# Patient Record
Sex: MALE | Race: WHITE | NOT HISPANIC OR LATINO | Employment: OTHER | URBAN - METROPOLITAN AREA
[De-identification: names, ages, dates, MRNs, and addresses within clinical notes are randomized per-mention and may not be internally consistent; named-entity substitution may affect disease eponyms.]

---

## 2017-04-04 ENCOUNTER — TELEPHONE (OUTPATIENT)
Dept: UROLOGY | Facility: CLINIC | Age: 32
End: 2017-04-04

## 2017-04-04 RX ORDER — OXYCODONE HYDROCHLORIDE 15 MG/1
15 TABLET ORAL
COMMUNITY
Start: 2016-12-15 | End: 2016-12-25

## 2017-04-04 NOTE — TELEPHONE ENCOUNTER
----- Message from Yesy Kat sent at 4/4/2017 12:24 PM CDT -----  Contact: wife  Wife - Cruzito Richmond - [phone] has scheduled patient's appt with Dr Harvey for Thursday 04 27 17 for his 6 month checkup/patient will also need blood labs and he goes to [facility] in [place]/please call wife concerning orders for blood work

## 2017-04-27 ENCOUNTER — OFFICE VISIT (OUTPATIENT)
Dept: UROLOGY | Facility: CLINIC | Age: 32
End: 2017-04-27
Payer: COMMERCIAL

## 2017-04-27 VITALS
HEIGHT: 72 IN | DIASTOLIC BLOOD PRESSURE: 80 MMHG | HEART RATE: 76 BPM | SYSTOLIC BLOOD PRESSURE: 140 MMHG | WEIGHT: 165.38 LBS | BODY MASS INDEX: 22.4 KG/M2

## 2017-04-27 DIAGNOSIS — N46.9 MALE FERTILITY PROBLEMS: ICD-10-CM

## 2017-04-27 DIAGNOSIS — R79.89 LOW TESTOSTERONE LEVEL IN MALE: Primary | ICD-10-CM

## 2017-04-27 DIAGNOSIS — C62.11 SEMINOMA OF DESCENDED RIGHT TESTIS: ICD-10-CM

## 2017-04-27 LAB
BILIRUB SERPL-MCNC: NORMAL MG/DL
BLOOD URINE, POC: NORMAL
COLOR, POC UA: YELLOW
GLUCOSE UR QL STRIP: NORMAL
KETONES UR QL STRIP: NORMAL
LEUKOCYTE ESTERASE URINE, POC: NORMAL
NITRITE, POC UA: NORMAL
PH, POC UA: 5
PROTEIN, POC: NORMAL
SPECIFIC GRAVITY, POC UA: 1.03
UROBILINOGEN, POC UA: NORMAL

## 2017-04-27 PROCEDURE — 99214 OFFICE O/P EST MOD 30 MIN: CPT | Mod: 25,S$GLB,, | Performed by: UROLOGY

## 2017-04-27 PROCEDURE — 99999 PR PBB SHADOW E&M-EST. PATIENT-LVL III: CPT | Mod: PBBFAC,,, | Performed by: UROLOGY

## 2017-04-27 PROCEDURE — 81002 URINALYSIS NONAUTO W/O SCOPE: CPT | Mod: S$GLB,,, | Performed by: UROLOGY

## 2017-04-27 PROCEDURE — 1160F RVW MEDS BY RX/DR IN RCRD: CPT | Mod: S$GLB,,, | Performed by: UROLOGY

## 2017-04-27 RX ORDER — GABAPENTIN 300 MG/1
600 CAPSULE ORAL 3 TIMES DAILY
COMMUNITY
End: 2022-04-12

## 2017-04-27 RX ORDER — ESCITALOPRAM OXALATE 10 MG/1
30 TABLET ORAL
COMMUNITY
End: 2017-04-27

## 2017-04-27 RX ORDER — BUPRENORPHINE HYDROCHLORIDE AND NALOXONE HYDROCHLORIDE DIHYDRATE 2; .5 MG/1; MG/1
8 TABLET SUBLINGUAL DAILY
COMMUNITY
End: 2022-04-12

## 2017-04-27 RX ORDER — DEXTROAMPHETAMINE SACCHARATE, AMPHETAMINE ASPARTATE, DEXTROAMPHETAMINE SULFATE AND AMPHETAMINE SULFATE 5; 5; 5; 5 MG/1; MG/1; MG/1; MG/1
20 TABLET ORAL
COMMUNITY
End: 2017-04-27

## 2017-04-27 RX ORDER — DEXTROAMPHETAMINE SACCHARATE, AMPHETAMINE ASPARTATE, DEXTROAMPHETAMINE SULFATE AND AMPHETAMINE SULFATE 7.5; 7.5; 7.5; 7.5 MG/1; MG/1; MG/1; MG/1
30 TABLET ORAL 2 TIMES DAILY
COMMUNITY

## 2017-04-27 RX ORDER — CLOMIPHENE CITRATE 50 MG/1
TABLET ORAL
Qty: 15 TABLET | Refills: 2 | Status: SHIPPED | OUTPATIENT
Start: 2017-04-27 | End: 2017-05-31 | Stop reason: SDUPTHER

## 2017-04-27 NOTE — MR AVS SNAPSHOT
[facility]  [address] [facility] [address]  [facility] [address]  Phone: [phone]                  Dar Richmond   2017 9:15 AM   Office Visit    Description:  Male : 1985   Provider:  SEBAS Harvey MD   Department:  [facility]           Reason for Visit     Low Testosterone                To Do List           Goals (5 Years of Data)     None      [facility] On Call     [facility] On Call Nurse Care Line -  Assistance  Unless otherwise directed by your provider, please contact [facility], our nurse care line that is available for  assistance.     Registered nurses in the [facility] provide: appointment scheduling, clinical advisement, health education, and other advisory services.  Call: [phone] (toll free)               Medications           Message regarding Medications     Verify the changes and/or additions to your medication regime listed below are the same as discussed with your clinician today.  If any of these changes or additions are incorrect, please notify your healthcare provider.        STOP taking these medications     dextroamphetamine-amphetamine (ADDERALL) 20 mg tablet Take 20 mg by mouth.    diphenhydrAMINE (BENADRYL) 50 MG capsule Take 50 mg by mouth nightly as needed (for post nasal drip).    escitalopram oxalate (LEXAPRO) 10 MG tablet Take 30 mg by mouth.    ondansetron (ZOFRAN-ODT) 8 MG TbDL Take 1 tablet (8 mg total) by mouth every 6 (six) hours as needed.    pantoprazole (PROTONIX) 40 MG tablet Take 40 mg by mouth 2 (two) times daily.     sucralfate (CARAFATE) 1 gram tablet Take 1 g by mouth 3 (three) times daily.     testosterone cypionate (DEPOTESTOTERONE CYPIONATE) 200 mg/mL injection Inject into the muscle every 14 (fourteen) days.           Verify that the below list of medications is an accurate representation of the medications you are currently taking.  If none reported, the list may be blank. If incorrect, please  "contact your healthcare provider. Carry this list with you in case of emergency.           Current Medications     buprenorphine-naloxone 2-0.5 mg (SUBOXONE) 2-0.5 mg Subl Place 8 mg under the tongue every 8 (eight) hours as needed.    dextroamphetamine-amphetamine (ADDERALL) 30 mg Tab Take by mouth.    ESCITALOPRAM OXALATE (LEXAPRO ORAL) Take 10 mg by mouth once daily.     gabapentin (NEURONTIN) 300 MG capsule Take 300 mg by mouth every evening.           Clinical Reference Information           Your Vitals Were     BP Pulse Height Weight BMI    140/80 (BP Location: Right arm) 76 5' 11.5" (1.816 m) 75 kg (165 lb 5.5 oz) 22.74 kg/m2      Blood Pressure          Most Recent Value    BP  (!)  140/80      Allergies as of 4/27/2017     No Known Allergies      Immunizations Administered on Date of Encounter - 4/27/2017     None      Language Assistance Services     ATTENTION: Language assistance services are available, free of charge. Please call [phone].      ATENCIÓN: Si habla gavin, tiene a altman disposición servicios gratuitos de asistencia lingüística. Llame al [phone].     YA Ý: N?u b?n nói Ti?ng Vi?t, có các d?ch v? h? tr? ngôn ng? mi?n phí dành cho b?n. G?i s? [phone].         [facility] complies with applicable Federal civil rights laws and does not discriminate on the basis of race, color, national origin, age, disability, or sex.        "

## 2017-04-27 NOTE — PROGRESS NOTES
Subjective:       Patient ID: Dar Richmond is a 32 y.o. male.    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 He underwent orchiectomy and prosthesis placement in September 2012 He has elected active surveillance and His last CT scan was negative. He also has symptomatic low testosterone and has been on IM testosterone replacement for years. He is seen today with his wife and they have been actively trying to conceive for the last 3 months.  He stopped the testosterone replacement 2 months ago.  He has decreased energy and severe decreased libido.  Repeat labs reviewed.  Testosterone level is 163.  FSH/LH/Prolactin all in normal range.   He has no significant voiding complaints although he does note hesitancy but has a strong flow.  Urine dipstick shows negative for all components.    Review of Systems   Constitutional: Negative for fever.   Genitourinary: Negative for dysuria and hematuria.       Objective:      Physical Exam   Constitutional: He is oriented to person, place, and time. He appears well-developed and well-nourished.   HENT:   Head: Normocephalic and atraumatic.   Eyes: Conjunctivae are normal.   Cardiovascular: Normal rate.    Pulmonary/Chest: Effort normal.   Abdominal: Soft. There is no tenderness.   Musculoskeletal: Normal range of motion. He exhibits no edema.   Neurological: He is alert and oriented to person, place, and time.   Skin: Skin is warm and dry. No rash noted.   Psychiatric: He has a normal mood and affect.   Vitals reviewed.      Assessment:       1. Low testosterone level in male    2. Male fertility problems    3. Seminoma of descended right testis        Plan:       Low testosterone level in male  -     POCT URINE DIPSTICK WITHOUT MICROSCOPE  -     clomiPHENE (CLOMID) 50 mg tablet; Half tablet daily  Dispense: 15 tablet; Refill: 2  -     Follicle stimulating hormone; Future; Expected date: 5/27/17  -      Luteinizing hormone; Future; Expected date: 5/27/17  -     Testosterone; Future; Expected date: 5/27/17  -     Estrogens, total; Future; Expected date: 5/27/17  -     Prolactin; Future; Expected date: 5/27/17    Male fertility problems  -     clomiPHENE (CLOMID) 50 mg tablet; Half tablet daily  Dispense: 15 tablet; Refill: 2    Seminoma of descended right testis      Will start a trial of Clomid for low testosterone in order to preserve spermatogenesis.  F/u 4 weeks with repeat labs.

## 2017-05-28 LAB
COMMENT: NORMAL
ESTROGEN SERPL-MCNC: 132 PG/ML (ref 40–115)
FSH SERPL-ACNC: 9.9 MIU/ML (ref 1.5–12.4)
LH SERPL-ACNC: 9.6 MIU/ML (ref 1.7–8.6)
PROLACTIN SERPL-MCNC: 9.4 NG/ML (ref 4–15.2)
TESTOST SERPL-MCNC: 405 NG/DL (ref 348–1197)

## 2017-05-31 ENCOUNTER — OFFICE VISIT (OUTPATIENT)
Dept: UROLOGY | Facility: CLINIC | Age: 32
End: 2017-05-31
Payer: COMMERCIAL

## 2017-05-31 VITALS
HEIGHT: 72 IN | WEIGHT: 171.06 LBS | SYSTOLIC BLOOD PRESSURE: 115 MMHG | DIASTOLIC BLOOD PRESSURE: 69 MMHG | HEART RATE: 57 BPM | BODY MASS INDEX: 23.17 KG/M2

## 2017-05-31 DIAGNOSIS — N46.9 MALE FERTILITY PROBLEMS: ICD-10-CM

## 2017-05-31 DIAGNOSIS — R79.89 LOW TESTOSTERONE LEVEL IN MALE: Primary | ICD-10-CM

## 2017-05-31 LAB
BILIRUB SERPL-MCNC: NORMAL MG/DL
BLOOD URINE, POC: NORMAL
COLOR, POC UA: YELLOW
GLUCOSE UR QL STRIP: NORMAL
KETONES UR QL STRIP: NORMAL
LEUKOCYTE ESTERASE URINE, POC: NORMAL
NITRITE, POC UA: NORMAL
PH, POC UA: 7
PROTEIN, POC: NORMAL
SPECIFIC GRAVITY, POC UA: 1.01
UROBILINOGEN, POC UA: NORMAL

## 2017-05-31 PROCEDURE — 81002 URINALYSIS NONAUTO W/O SCOPE: CPT | Mod: S$GLB,,, | Performed by: UROLOGY

## 2017-05-31 PROCEDURE — 99213 OFFICE O/P EST LOW 20 MIN: CPT | Mod: 25,S$GLB,, | Performed by: UROLOGY

## 2017-05-31 PROCEDURE — 99999 PR PBB SHADOW E&M-EST. PATIENT-LVL III: CPT | Mod: PBBFAC,,, | Performed by: UROLOGY

## 2017-05-31 RX ORDER — CLOMIPHENE CITRATE 50 MG/1
TABLET ORAL
Qty: 15 TABLET | Refills: 3 | Status: SHIPPED | OUTPATIENT
Start: 2017-05-31 | End: 2017-11-14 | Stop reason: SDUPTHER

## 2017-05-31 RX ORDER — ANASTROZOLE 1 MG/1
1 TABLET ORAL
Qty: 8 TABLET | Refills: 6 | Status: SHIPPED | OUTPATIENT
Start: 2017-06-01 | End: 2018-01-16 | Stop reason: SDUPTHER

## 2017-05-31 NOTE — PROGRESS NOTES
Subjective:       Patient ID: Dar Richmond is a 32 y.o. male.    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 He underwent orchiectomy and prosthesis placement in September 2012 He has elected active surveillance and His last CT scan was negative. He also has symptomatic low testosterone and has been on IM testosterone replacement for years.  He and his wife have been actively trying to conceive for the last 4 months.  He stopped the testosterone replacement.  Testosterone level decreased to 163.   Began Clomid 1 month ago.  Testosterone level increased to 405.  FSH/LH increased.  Estrogen level increased as well. He has no significant voiding complaints.  Energy level is   Urine dipstick shows negative for all components.    Review of Systems   Constitutional: Negative for fever.   Genitourinary: Negative for dysuria and hematuria.       Objective:      Physical Exam   Constitutional: He is oriented to person, place, and time. He appears well-developed and well-nourished.   Pulmonary/Chest: Effort normal.   Abdominal: Soft.   Neurological: He is alert and oriented to person, place, and time.   Skin: No rash noted.   Psychiatric: He has a normal mood and affect.   Vitals reviewed.      Assessment:       1. Low testosterone level in male    2. Male fertility problems        Plan:       Low testosterone level in male  -     POCT URINE DIPSTICK WITHOUT MICROSCOPE  -     clomiPHENE (CLOMID) 50 mg tablet; Half tablet daily  Dispense: 15 tablet; Refill: 3  -     Testosterone; Future; Expected date: 08/31/2017  -     Luteinizing hormone; Future; Expected date: 08/31/2017  -     Follicle stimulating hormone; Future; Expected date: 08/31/2017  -     Estrogens, total; Future; Expected date: 08/31/2017    Male fertility problems  -     clomiPHENE (CLOMID) 50 mg tablet; Half tablet daily  Dispense: 15 tablet; Refill: 3    Other orders  -     anastrozole (ARIMIDEX) 1  mg Tab; Take 1 tablet (1 mg total) by mouth twice a week.  Dispense: 8 tablet; Refill: 6      Labs improved.   Add Arimadex for increased estrogen.  RTC 3 months with repeat labs.

## 2017-08-31 ENCOUNTER — OFFICE VISIT (OUTPATIENT)
Dept: UROLOGY | Facility: CLINIC | Age: 32
End: 2017-08-31
Payer: COMMERCIAL

## 2017-08-31 VITALS
DIASTOLIC BLOOD PRESSURE: 70 MMHG | BODY MASS INDEX: 23.17 KG/M2 | SYSTOLIC BLOOD PRESSURE: 122 MMHG | WEIGHT: 171.06 LBS | HEIGHT: 72 IN | HEART RATE: 76 BPM

## 2017-08-31 DIAGNOSIS — R79.89 LOW TESTOSTERONE: Primary | ICD-10-CM

## 2017-08-31 PROCEDURE — 99999 PR PBB SHADOW E&M-EST. PATIENT-LVL III: CPT | Mod: PBBFAC,,, | Performed by: UROLOGY

## 2017-08-31 PROCEDURE — 3008F BODY MASS INDEX DOCD: CPT | Mod: S$GLB,,, | Performed by: UROLOGY

## 2017-08-31 PROCEDURE — 99213 OFFICE O/P EST LOW 20 MIN: CPT | Mod: S$GLB,,, | Performed by: UROLOGY

## 2017-08-31 NOTE — PROGRESS NOTES
Subjective:       Patient ID: Dar Richmond is a 32 y.o. male.    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 He underwent orchiectomy and prosthesis placement in September 2012 He has elected active surveillance and His last CT scan was negative. He also has symptomatic low testosterone and has been on IM testosterone replacement for years. He and his wife have been actively trying to conceive for the last 6 months.  We stopped the testosterone replacement and began Clomid with Arimidex.  He feels good.  He had a semen analysis with all normal parameters.   Repeat labs are pending  Urine dipstick shows negative for all components.    Addendum:  Reviewed labs.  Testosterone level low 268,  LH and FSH in normal range.      Current Outpatient Prescriptions:     anastrozole (ARIMIDEX) 1 mg Tab, Take 1 tablet (1 mg total) by mouth twice a week., Disp: 8 tablet, Rfl: 6    buprenorphine-naloxone 2-0.5 mg (SUBOXONE) 2-0.5 mg Subl, Place 8 mg under the tongue every 8 (eight) hours as needed., Disp: , Rfl:     clomiPHENE (CLOMID) 50 mg tablet, Half tablet daily, Disp: 15 tablet, Rfl: 3    dextroamphetamine-amphetamine (ADDERALL) 30 mg Tab, Take by mouth., Disp: , Rfl:     ESCITALOPRAM OXALATE (LEXAPRO ORAL), Take 10 mg by mouth once daily. , Disp: , Rfl:     gabapentin (NEURONTIN) 300 MG capsule, Take 300 mg by mouth 2 (two) times daily. , Disp: , Rfl:     Review of Systems   Constitutional: Negative for fever.   Genitourinary: Negative for dysuria and hematuria.       Objective:      Physical Exam   Constitutional: He is oriented to person, place, and time. He appears well-developed and well-nourished.   Pulmonary/Chest: Effort normal.   Abdominal: Soft.   Neurological: He is alert and oriented to person, place, and time.   Skin: No rash noted.   Psychiatric: He has a normal mood and affect.   Vitals reviewed.      Assessment:       1. Low testosterone         Plan:       Low testosterone      Continue current meds.  F/u labs.  RTC 6 months

## 2017-10-12 ENCOUNTER — TELEPHONE (OUTPATIENT)
Dept: UROLOGY | Facility: CLINIC | Age: 32
End: 2017-10-12

## 2017-10-12 NOTE — TELEPHONE ENCOUNTER
----- Message from Jabari Schmidt sent at 10/12/2017  3:40 PM CDT -----  Contact: Wife, Cruzito  Placed call to pod, patient miss call from your office please call back at [phone] (hzty)

## 2017-10-12 NOTE — TELEPHONE ENCOUNTER
----- Message from Perlita Craft sent at 10/12/2017  2:48 PM CDT -----  Contact: wife Cruzito   Wife Cruzito want to speak with a nurse regarding patient test results. Please call back at [phone]

## 2017-10-30 ENCOUNTER — TELEPHONE (OUTPATIENT)
Dept: UROLOGY | Facility: CLINIC | Age: 32
End: 2017-10-30

## 2017-10-30 NOTE — TELEPHONE ENCOUNTER
----- Message from Yesy Kat sent at 10/30/2017 10:31 AM CDT -----  Contact: wife  Wife Cruzito Richmond [phone] has scheduled an appt with Dr Harvey for Friday 11 17 17  At 9:15am for testicular pain and a lump on testicle/she is asking if there is any way patient could get an earlier appt for patient?/please advise

## 2017-11-01 ENCOUNTER — OFFICE VISIT (OUTPATIENT)
Dept: UROLOGY | Facility: CLINIC | Age: 32
End: 2017-11-01
Payer: COMMERCIAL

## 2017-11-01 ENCOUNTER — HOSPITAL ENCOUNTER (OUTPATIENT)
Dept: RADIOLOGY | Facility: HOSPITAL | Age: 32
Discharge: HOME | End: 2017-11-01
Attending: UROLOGY
Payer: COMMERCIAL

## 2017-11-01 VITALS
SYSTOLIC BLOOD PRESSURE: 127 MMHG | HEIGHT: 72 IN | WEIGHT: 190.69 LBS | BODY MASS INDEX: 25.83 KG/M2 | DIASTOLIC BLOOD PRESSURE: 76 MMHG | HEART RATE: 74 BPM

## 2017-11-01 DIAGNOSIS — Z85.47 HISTORY OF TESTICULAR CANCER: ICD-10-CM

## 2017-11-01 DIAGNOSIS — N50.819 TESTICULAR PAIN, UNSPECIFIED: Primary | ICD-10-CM

## 2017-11-01 LAB
BILIRUB SERPL-MCNC: NEGATIVE MG/DL
BLOOD URINE, POC: NEGATIVE
COLOR, POC UA: NORMAL
GLUCOSE UR QL STRIP: NEGATIVE
KETONES UR QL STRIP: NEGATIVE
LEUKOCYTE ESTERASE URINE, POC: NEGATIVE
NITRITE, POC UA: NEGATIVE
PH, POC UA: 5
PROTEIN, POC: NEGATIVE
SPECIFIC GRAVITY, POC UA: 1.02
UROBILINOGEN, POC UA: NEGATIVE

## 2017-11-01 PROCEDURE — 81002 URINALYSIS NONAUTO W/O SCOPE: CPT | Mod: S$GLB,,, | Performed by: UROLOGY

## 2017-11-01 PROCEDURE — 76870 US EXAM SCROTUM: CPT | Mod: TC,PO

## 2017-11-01 PROCEDURE — 76870 US EXAM SCROTUM: CPT | Mod: 26,,, | Performed by: RADIOLOGY

## 2017-11-01 PROCEDURE — 99214 OFFICE O/P EST MOD 30 MIN: CPT | Mod: 25,S$GLB,, | Performed by: UROLOGY

## 2017-11-01 PROCEDURE — 99999 PR PBB SHADOW E&M-EST. PATIENT-LVL III: CPT | Mod: PBBFAC,,, | Performed by: UROLOGY

## 2017-11-01 RX ORDER — ESCITALOPRAM OXALATE 20 MG/1
30 TABLET ORAL DAILY
COMMUNITY
End: 2022-04-12

## 2017-11-01 NOTE — PROGRESS NOTES
Subjective:       Patient ID: Dar Richmond is a 32 y.o. male.    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 He underwent orchiectomy and prosthesis placement in September 2012 He has elected active surveillance and His last CT scan was negative. He also has symptomatic low testosterone and has been on IM testosterone replacement for years. He and his wife have been actively trying to conceive for the last 6 months. We stopped the testosterone replacement and began Clomid with Arimidex.  Repeat semen analysis with all normal parameters.   He now feels a small painful lump on the left testes recently.    Urine dipstick shows negative for all components.    Review of Systems   Constitutional: Negative for fever.   Genitourinary: Negative for dysuria and hematuria.       Objective:      Physical Exam   Constitutional: He is oriented to person, place, and time. He appears well-developed and well-nourished.   HENT:   Head: Normocephalic and atraumatic.   Eyes: Conjunctivae are normal.   Cardiovascular: Normal rate.    Pulmonary/Chest: Effort normal.   Genitourinary: Penis normal.   Genitourinary Comments: Left testes is normal.  Small epididymal cyst.    Right prosthesis.   Musculoskeletal: Normal range of motion. He exhibits no edema.   Neurological: He is alert and oriented to person, place, and time.   Skin: Skin is warm and dry. No rash noted.   Psychiatric: He has a normal mood and affect.   Vitals reviewed.      Assessment:       1. Testicular pain, unspecified    2. History of testicular cancer        Plan:       Testicular pain, unspecified  -     POCT URINE DIPSTICK WITHOUT MICROSCOPE    History of testicular cancer  -     US Scrotum And Testicles; Future; Expected date: 11/01/2017

## 2017-11-14 DIAGNOSIS — N46.9 MALE FERTILITY PROBLEMS: ICD-10-CM

## 2017-11-14 DIAGNOSIS — R79.89 LOW TESTOSTERONE LEVEL IN MALE: ICD-10-CM

## 2017-11-14 NOTE — TELEPHONE ENCOUNTER
----- Message from Irene Tan sent at 11/14/2017 11:31 AM CST -----  Contact: Patient's wife, Cruzito  Patient's wife is calling to request a refill on clomiPHENE (CLOMID) 50 mg tablet for patient.  Patient is out of medication.  Call Back#  Thanks     [facility] [place] - [id number] [facility]  [place] [address]  Phone: [phone] Fax: [phone]

## 2017-11-15 RX ORDER — CLOMIPHENE CITRATE 50 MG/1
TABLET ORAL
Qty: 15 TABLET | Refills: 3 | Status: SHIPPED | OUTPATIENT
Start: 2017-11-15 | End: 2018-04-04 | Stop reason: SDUPTHER

## 2018-01-16 DIAGNOSIS — E29.1 MALE HYPOGONADISM: Primary | ICD-10-CM

## 2018-01-16 DIAGNOSIS — N46.9 MALE FERTILITY PROBLEM: ICD-10-CM

## 2018-01-16 DIAGNOSIS — C62.90 MALIGNANT NEOPLASM OF TESTIS, UNSPECIFIED LATERALITY, UNSPECIFIED WHETHER DESCENDED OR UNDESCENDED: ICD-10-CM

## 2018-01-16 NOTE — TELEPHONE ENCOUNTER
----- Message from Robi Garza sent at 1/16/2018  1:37 PM CST -----  Contact: Wife- Cruzito [phone]  Patient needs a refill on rx anastrozole (ARIMIDEX) 1 mg Tab with [facility] in [place].. Thanks!

## 2018-01-17 RX ORDER — ANASTROZOLE 1 MG/1
1 TABLET ORAL
Qty: 8 TABLET | Refills: 6 | Status: SHIPPED | OUTPATIENT
Start: 2018-01-18 | End: 2018-08-29 | Stop reason: SDUPTHER

## 2018-04-04 DIAGNOSIS — N46.9 MALE FERTILITY PROBLEMS: ICD-10-CM

## 2018-04-04 DIAGNOSIS — R79.89 LOW TESTOSTERONE LEVEL IN MALE: ICD-10-CM

## 2018-04-04 RX ORDER — CLOMIPHENE CITRATE 50 MG/1
TABLET ORAL
Qty: 15 TABLET | Refills: 3 | Status: SHIPPED | OUTPATIENT
Start: 2018-04-04 | End: 2018-08-29 | Stop reason: SDUPTHER

## 2018-04-04 NOTE — TELEPHONE ENCOUNTER
----- Message from Perlita Craft sent at 4/4/2018  1:48 PM CDT -----  Contact: wife Cruzito   Wife Cruzito called to check status of refill request for rx clomiPHENE (CLOMID) 50 mg tablet, please send to [facility], please call back at [phone] (home)       [facility] [place] [id number] [facility]  [place] [address]  Phone: [phone] Fax: [phone]

## 2018-05-25 PROBLEM — L03.90 CELLULITIS: Status: ACTIVE | Noted: 2018-05-25

## 2018-05-25 PROBLEM — F15.10 AMPHETAMINE ABUSE: Status: ACTIVE | Noted: 2018-05-25

## 2018-05-26 PROBLEM — F19.10 POLYSUBSTANCE ABUSE: Status: ACTIVE | Noted: 2018-05-26

## 2018-08-29 DIAGNOSIS — R79.89 LOW TESTOSTERONE LEVEL IN MALE: ICD-10-CM

## 2018-08-29 DIAGNOSIS — N46.9 MALE FERTILITY PROBLEMS: ICD-10-CM

## 2018-08-29 DIAGNOSIS — N46.9 MALE FERTILITY PROBLEM: ICD-10-CM

## 2018-08-29 DIAGNOSIS — E29.1 MALE HYPOGONADISM: ICD-10-CM

## 2018-08-29 DIAGNOSIS — C62.90 MALIGNANT NEOPLASM OF TESTIS, UNSPECIFIED LATERALITY, UNSPECIFIED WHETHER DESCENDED OR UNDESCENDED: ICD-10-CM

## 2018-08-29 NOTE — TELEPHONE ENCOUNTER
----- Message from Karen Franks sent at 8/29/2018  2:18 PM CDT -----  Contact:  wifeCruzito   Type:  RX Refill Request    Who Called: wifeCruzito   Refill or New Rx:  refill  RX Name and Strength:  clomiPHENE (CLOMID) 50 mg tablet; anastrozole (ARIMIDEX) 1 mg Tab  How is the patient currently taking it? (ex. 1XDay):    Is this a 30 day or 90 day RX:  30 with refills  Preferred Pharmacy with phone number:  [facility]  Local or Mail Order:  local  Ordering Provider:  Dr Wes No Call Back Number:  [phone]  Additional Information:    [facility] [place] - [id number] [facility]  [place] [address]  Phone: [phone] Fax: [phone]

## 2018-08-30 RX ORDER — CLOMIPHENE CITRATE 50 MG/1
TABLET ORAL
Qty: 15 TABLET | Refills: 3 | Status: SHIPPED | OUTPATIENT
Start: 2018-08-30 | End: 2019-01-21 | Stop reason: SDUPTHER

## 2018-08-30 RX ORDER — ANASTROZOLE 1 MG/1
1 TABLET ORAL
Qty: 8 TABLET | Refills: 6 | Status: SHIPPED | OUTPATIENT
Start: 2018-08-30 | End: 2019-07-15 | Stop reason: SDUPTHER

## 2019-01-21 DIAGNOSIS — N46.9 MALE FERTILITY PROBLEMS: ICD-10-CM

## 2019-01-21 DIAGNOSIS — R79.89 LOW TESTOSTERONE LEVEL IN MALE: ICD-10-CM

## 2019-01-21 RX ORDER — CLOMIPHENE CITRATE 50 MG/1
TABLET ORAL
Qty: 15 TABLET | Refills: 3 | Status: SHIPPED | OUTPATIENT
Start: 2019-01-21 | End: 2019-07-15 | Stop reason: SDUPTHER

## 2019-01-21 NOTE — TELEPHONE ENCOUNTER
----- Message from Sintia Portillo sent at 1/21/2019  4:08 PM CST -----  Contact: Patient  Type:  RX Refill Request    Who Called:  Patient  Refill or New Rx:  Refill  RX Name and Strength:  clomiPHENE (CLOMID) 50 mg tablet  How is the patient currently taking it? (ex. 1XDay):  1x day  Is this a 30 day or 90 day RX:  30 day  Preferred Pharmacy with phone number:    [facility] [place]  [id number] [facility] [address]  Phone: [phone] Fax: [phone]     Local or Mail Order:  local  Ordering Provider:  Dr. Harvey  [facility] Call Back Number: [phone] (home)    Additional Information:  mitra

## 2019-07-15 DIAGNOSIS — N46.9 MALE FERTILITY PROBLEMS: ICD-10-CM

## 2019-07-15 DIAGNOSIS — N46.9 MALE FERTILITY PROBLEM: ICD-10-CM

## 2019-07-15 DIAGNOSIS — R79.89 LOW TESTOSTERONE LEVEL IN MALE: ICD-10-CM

## 2019-07-15 DIAGNOSIS — E29.1 MALE HYPOGONADISM: ICD-10-CM

## 2019-07-15 DIAGNOSIS — C62.90 MALIGNANT NEOPLASM OF TESTIS, UNSPECIFIED LATERALITY, UNSPECIFIED WHETHER DESCENDED OR UNDESCENDED: ICD-10-CM

## 2019-07-15 NOTE — TELEPHONE ENCOUNTER
----- Message from Argelia Sushant sent at 7/15/2019  3:32 PM CDT -----  Contact: pt  anastrozole (ARIMIDEX) 1 mg Tab 8 tablet     Take 1 tablet (1 mg total) by mouth twice a week. - Oral    [facility] [place] - [address] [facility]      clomiPHENE (CLOMID) 50 mg tablet    Half tablet daily    [facility] [place] - [address] [facility]      Please contact pt once it has been sent or if any questions [phone]

## 2019-07-16 RX ORDER — ANASTROZOLE 1 MG/1
1 TABLET ORAL
Qty: 8 TABLET | Refills: 6 | Status: SHIPPED | OUTPATIENT
Start: 2019-07-18 | End: 2020-02-04 | Stop reason: SDUPTHER

## 2019-07-16 RX ORDER — CLOMIPHENE CITRATE 50 MG/1
TABLET ORAL
Qty: 15 TABLET | Refills: 3 | Status: SHIPPED | OUTPATIENT
Start: 2019-07-16 | End: 2020-02-04 | Stop reason: SDUPTHER

## 2020-02-04 DIAGNOSIS — R79.89 LOW TESTOSTERONE LEVEL IN MALE: ICD-10-CM

## 2020-02-04 DIAGNOSIS — E29.1 MALE HYPOGONADISM: ICD-10-CM

## 2020-02-04 DIAGNOSIS — C62.90 MALIGNANT NEOPLASM OF TESTIS, UNSPECIFIED LATERALITY, UNSPECIFIED WHETHER DESCENDED OR UNDESCENDED: ICD-10-CM

## 2020-02-04 DIAGNOSIS — N46.9 MALE FERTILITY PROBLEM: ICD-10-CM

## 2020-02-04 DIAGNOSIS — N46.9 MALE FERTILITY PROBLEMS: ICD-10-CM

## 2020-02-04 RX ORDER — CLOMIPHENE CITRATE 50 MG/1
TABLET ORAL
Qty: 15 TABLET | Refills: 3 | Status: SHIPPED | OUTPATIENT
Start: 2020-02-04 | End: 2020-02-21 | Stop reason: ALTCHOICE

## 2020-02-04 RX ORDER — ANASTROZOLE 1 MG/1
1 TABLET ORAL
Qty: 8 TABLET | Refills: 6 | Status: SHIPPED | OUTPATIENT
Start: 2020-02-06 | End: 2022-04-12

## 2020-02-04 NOTE — TELEPHONE ENCOUNTER
----- Message from Annika Oliveira sent at 2/4/2020 11:38 AM CST -----  Contact: pt  Type: Needs Medical Advice    Who Called:      Best Call Back Number:     Additional Information: Requesting a call back from Nurse, regarding a refill for Rx anastrozole (ARIMIDEX) 1 mg Tab and Rx clomiPHENE (CLOMID) 50 mg tablet pt has been out for over a week Pharmacy has been sending in request and no response ,please call [facility] is send over PER PT request

## 2020-02-05 ENCOUNTER — TELEPHONE (OUTPATIENT)
Dept: UROLOGY | Facility: CLINIC | Age: 35
End: 2020-02-05

## 2020-02-05 NOTE — TELEPHONE ENCOUNTER
----- Message from Itz Juárez sent at 2/5/2020 11:37 AM CST -----  Contact: pt  Pt called and needs a refill for clomiPHENE (CLOMID) 50 mg tablet [[id number]]   AND anastrozole (ARIMIDEX) 1 mg Tab [[id number]]      Pt called twice and pharmacy has sent over numerous requests and no one is replying.    Pt can be reached at [phone]

## 2020-02-05 NOTE — TELEPHONE ENCOUNTER
Spoke to pt and and medication refilled yesterday. Pt verbalized understanding. I transferred pt to phone room to make an annual apt with us

## 2020-02-21 ENCOUNTER — LAB VISIT (OUTPATIENT)
Dept: LAB | Facility: HOSPITAL | Age: 35
End: 2020-02-21
Attending: UROLOGY
Payer: COMMERCIAL

## 2020-02-21 ENCOUNTER — OFFICE VISIT (OUTPATIENT)
Dept: UROLOGY | Facility: CLINIC | Age: 35
End: 2020-02-21
Payer: COMMERCIAL

## 2020-02-21 VITALS
BODY MASS INDEX: 28.18 KG/M2 | HEART RATE: 69 BPM | WEIGHT: 201.25 LBS | HEIGHT: 71 IN | SYSTOLIC BLOOD PRESSURE: 126 MMHG | DIASTOLIC BLOOD PRESSURE: 80 MMHG

## 2020-02-21 DIAGNOSIS — R79.89 LOW TESTOSTERONE LEVEL IN MALE: Primary | ICD-10-CM

## 2020-02-21 DIAGNOSIS — R79.89 LOW TESTOSTERONE LEVEL IN MALE: ICD-10-CM

## 2020-02-21 LAB
COMPLEXED PSA SERPL-MCNC: 0.34 NG/ML (ref 0–4)
TESTOST SERPL-MCNC: 67 NG/DL (ref 304–1227)

## 2020-02-21 PROCEDURE — 3008F PR BODY MASS INDEX (BMI) DOCUMENTED: ICD-10-PCS | Mod: CPTII,S$GLB,, | Performed by: UROLOGY

## 2020-02-21 PROCEDURE — 99213 OFFICE O/P EST LOW 20 MIN: CPT | Mod: S$GLB,,, | Performed by: UROLOGY

## 2020-02-21 PROCEDURE — 99213 PR OFFICE/OUTPT VISIT, EST, LEVL III, 20-29 MIN: ICD-10-PCS | Mod: S$GLB,,, | Performed by: UROLOGY

## 2020-02-21 PROCEDURE — 99999 PR PBB SHADOW E&M-EST. PATIENT-LVL III: CPT | Mod: PBBFAC,,, | Performed by: UROLOGY

## 2020-02-21 PROCEDURE — 36415 COLL VENOUS BLD VENIPUNCTURE: CPT | Mod: PO

## 2020-02-21 PROCEDURE — 3008F BODY MASS INDEX DOCD: CPT | Mod: CPTII,S$GLB,, | Performed by: UROLOGY

## 2020-02-21 PROCEDURE — 84153 ASSAY OF PSA TOTAL: CPT

## 2020-02-21 PROCEDURE — 99999 PR PBB SHADOW E&M-EST. PATIENT-LVL III: ICD-10-PCS | Mod: PBBFAC,,, | Performed by: UROLOGY

## 2020-02-21 PROCEDURE — 84403 ASSAY OF TOTAL TESTOSTERONE: CPT

## 2020-02-21 RX ORDER — TESTOSTERONE CYPIONATE 200 MG/ML
200 INJECTION, SOLUTION INTRAMUSCULAR
Qty: 10 ML | Refills: 2 | Status: SHIPPED | OUTPATIENT
Start: 2020-02-21 | End: 2020-08-12

## 2020-02-21 NOTE — PROGRESS NOTES
Subjective:       Patient ID: Dar Richmond is a 35 y.o. male.    Chief Complaint: Annual Exam    HPI     35-year-old with a long history of low testosterone.  He has a distant history of a radical orchiectomy for T1 seminoma.  His surgery was in 2012.  More recently he and his wife have been trying to have children.  He has been on Clomid rather than testosterone injections.  He did finally have a child 3 months ago.  He is overall doing well he does complain of headaches in the morning.  Unfortunately he is wife were now .  He is hoping for reconciliation but for now is not trying to have any more children.  He occasionally has some difficulty getting his urine stream started but he has no bothersome symptoms.  He denies hematuria and dysuria.    Review of Systems   Constitutional: Negative for fever.   Genitourinary: Negative for dysuria and hematuria.       Objective:      Physical Exam   Constitutional: He is oriented to person, place, and time. He appears well-developed and well-nourished.   Pulmonary/Chest: Effort normal.   Abdominal: Soft.   Neurological: He is alert and oriented to person, place, and time.   Skin: No rash noted.   Psychiatric: He has a normal mood and affect.   Vitals reviewed.      Assessment:       1. Low testosterone level in male        Plan:       Low testosterone level in male  -     Testosterone; Future; Expected date: 02/21/2020  -     PSA, Screening; Future; Expected date: 02/21/2020  -     Testosterone; Future; Expected date: 05/23/2020  -     Hemoglobin; Future; Expected date: 05/23/2020  -     Hematocrit; Future; Expected date: 05/23/2020    Other orders  -     testosterone cypionate (DEPOTESTOTERONE CYPIONATE) 200 mg/mL injection; Inject 1 mL (200 mg total) into the muscle every 14 (fourteen) days.  Dispense: 10 mL; Refill: 2      hold Clomid.  Resume IM testosterone replacement.  Will get baseline testosterone today and follow-up 3 months with repeat the  testosterone.

## 2020-03-02 ENCOUNTER — TELEPHONE (OUTPATIENT)
Dept: UROLOGY | Facility: CLINIC | Age: 35
End: 2020-03-02

## 2020-03-02 NOTE — TELEPHONE ENCOUNTER
----- Message from Rashad Black sent at 3/2/2020 12:32 PM CST -----  Contact: Patient  Type: Needs Medical Advice    Who Called:  Patient  Best Call Back Number: [phone]  Additional Information: Patient would like to confirm medication dosage. Please call to advise. Thanks!

## 2020-04-24 ENCOUNTER — PATIENT MESSAGE (OUTPATIENT)
Dept: UROLOGY | Facility: CLINIC | Age: 35
End: 2020-04-24

## 2020-04-27 ENCOUNTER — PATIENT MESSAGE (OUTPATIENT)
Dept: UROLOGY | Facility: CLINIC | Age: 35
End: 2020-04-27

## 2020-05-11 ENCOUNTER — PATIENT MESSAGE (OUTPATIENT)
Dept: UROLOGY | Facility: CLINIC | Age: 35
End: 2020-05-11

## 2022-04-06 ENCOUNTER — TELEPHONE (OUTPATIENT)
Dept: UROLOGY | Facility: CLINIC | Age: 37
End: 2022-04-06
Payer: COMMERCIAL

## 2022-04-06 DIAGNOSIS — R79.89 LOW TESTOSTERONE IN MALE: Primary | ICD-10-CM

## 2022-04-06 NOTE — TELEPHONE ENCOUNTER
----- Message from Azul Thayer sent at 4/6/2022  1:30 PM CDT -----  Contact: patient  Type: Needs Medical Advice  Who Called:  patient   Symptoms (please be specific):    How long has patient had these symptoms:    Pharmacy name and phone #:    Best Call Back Number: [phone] (home)     Additional Information: patient requesting to schedule an appointment for low Testerone, system denied schedule, please contact to schedule. Last seen in 2020

## 2022-04-06 NOTE — TELEPHONE ENCOUNTER
Spoke with patient, he would like f/u to discuss low testosterone, orders entered into system for labs, patient expressed understanding he will need to have these labs drawn prior to appointment and 3-4 days after testosterone injection before 10 am . Appointment scheduled for 4/12/2022 @ 1 pm with nurse practitioner Heather. Patient expressed understanding.

## 2022-04-12 ENCOUNTER — OFFICE VISIT (OUTPATIENT)
Dept: UROLOGY | Facility: CLINIC | Age: 37
End: 2022-04-12
Payer: COMMERCIAL

## 2022-04-12 VITALS — WEIGHT: 205.94 LBS | HEIGHT: 71 IN | BODY MASS INDEX: 28.83 KG/M2

## 2022-04-12 DIAGNOSIS — R53.83 FATIGUE, UNSPECIFIED TYPE: ICD-10-CM

## 2022-04-12 DIAGNOSIS — Z85.47 HISTORY OF TESTICULAR CANCER: ICD-10-CM

## 2022-04-12 DIAGNOSIS — E29.1 HYPOGONADISM MALE: Primary | ICD-10-CM

## 2022-04-12 PROCEDURE — 3008F PR BODY MASS INDEX (BMI) DOCUMENTED: ICD-10-PCS | Mod: CPTII,S$GLB,,

## 2022-04-12 PROCEDURE — 1159F PR MEDICATION LIST DOCUMENTED IN MEDICAL RECORD: ICD-10-PCS | Mod: CPTII,S$GLB,,

## 2022-04-12 PROCEDURE — 99213 OFFICE O/P EST LOW 20 MIN: CPT | Mod: S$GLB,,,

## 2022-04-12 PROCEDURE — 1160F PR REVIEW ALL MEDS BY PRESCRIBER/CLIN PHARMACIST DOCUMENTED: ICD-10-PCS | Mod: CPTII,S$GLB,,

## 2022-04-12 PROCEDURE — 3008F BODY MASS INDEX DOCD: CPT | Mod: CPTII,S$GLB,,

## 2022-04-12 PROCEDURE — 99999 PR PBB SHADOW E&M-EST. PATIENT-LVL III: ICD-10-PCS | Mod: PBBFAC,,,

## 2022-04-12 PROCEDURE — 99999 PR PBB SHADOW E&M-EST. PATIENT-LVL III: CPT | Mod: PBBFAC,,,

## 2022-04-12 PROCEDURE — 1160F RVW MEDS BY RX/DR IN RCRD: CPT | Mod: CPTII,S$GLB,,

## 2022-04-12 PROCEDURE — 99213 PR OFFICE/OUTPT VISIT, EST, LEVL III, 20-29 MIN: ICD-10-PCS | Mod: S$GLB,,,

## 2022-04-12 PROCEDURE — 1159F MED LIST DOCD IN RCRD: CPT | Mod: CPTII,S$GLB,,

## 2022-04-12 RX ORDER — TESTOSTERONE CYPIONATE 200 MG/ML
200 INJECTION, SOLUTION INTRAMUSCULAR
Qty: 10 ML | Refills: 2 | Status: SHIPPED | OUTPATIENT
Start: 2022-04-12 | End: 2022-10-11

## 2022-04-12 RX ORDER — VENLAFAXINE 75 MG/1
75 TABLET ORAL
COMMUNITY
Start: 2022-04-11

## 2022-04-12 RX ORDER — BUPRENORPHINE 2 MG/1
4 TABLET SUBLINGUAL 2 TIMES DAILY
COMMUNITY

## 2022-04-12 RX ORDER — QUETIAPINE FUMARATE 100 MG/1
100 TABLET, FILM COATED ORAL
COMMUNITY
Start: 2022-04-11

## 2022-04-12 NOTE — PROGRESS NOTES
"  [facility] Note  Staff: HARJIT Odell    PCP: None    Chief Complaint: Low Testosterone    Subjective:        HPI: Dar Richmond is a 37 y.o. male NEW PATIENT to me presents today for continuation testosterone replacement therapy. He has been followed by Dr. Harvey but his last OV was 02/21/2020. He has a distant history of a radical orchiectomy for T1 seminoma and his surgery was in 2012. His last labs are also from 02/2020. He states he is very fatigued without testosterone replacement and "can sleep all weekend when I am not at work". His last injection was over a month ago. He has no urinary symptoms.     Questions asked the pt during ov today:  Urgency: No, urge incontinence? No  Dysuria: No  Gross Hematuria:No  Straining:No, Hesistancy:No, Intermittency:No}, Weak stream:No    Last PSA Screening:   Lab Results   Component Value Date    PSA 0.34 02/21/2020       History of Kidney Stones?:  No    Constipation issues?:  Yes, due to medications. Advised to try Miralax as needed      REVIEW OF SYSTEMS:  Review of Systems   Constitutional: Negative.  Negative for chills and fever.   HENT: Negative.    Eyes: Negative.    Respiratory: Negative.    Cardiovascular: Negative.    Gastrointestinal: Positive for constipation (due to medication). Negative for abdominal pain, nausea and vomiting.   Genitourinary: Negative.  Negative for dysuria, flank pain, frequency, hematuria and urgency.   Musculoskeletal: Negative.  Negative for back pain.   Skin: Negative.    Neurological: Negative.    Endo/Heme/Allergies: Negative.    Psychiatric/Behavioral: Negative.        PMHx:  Past Medical History:   Diagnosis Date    ADD (attention deficit disorder)     Testicular cancer        PSHx:  Past Surgical History:   Procedure Laterality Date    TESTICLE SURGERY Right        Fam Hx:   malignancies: No    kidney stones: No     Soc Hx:  Single, lives in [place]    Allergies:  Patient has no known " allergies.    Medications: reviewed     Objective:   There were no vitals filed for this visit.    Physical Exam  Constitutional:       Appearance: Normal appearance.   HENT:      Head: Normocephalic.      Mouth/Throat:      Mouth: Mucous membranes are moist.   Eyes:      Conjunctiva/sclera: Conjunctivae normal.      Pupils: Pupils are equal, round, and reactive to light.   Pulmonary:      Effort: Pulmonary effort is normal.   Abdominal:      Palpations: Abdomen is soft.      Tenderness: There is no abdominal tenderness. There is no right CVA tenderness or left CVA tenderness.   Musculoskeletal:         General: Normal range of motion.      Cervical back: Normal range of motion and neck supple.   Skin:     General: Skin is warm and dry.   Neurological:      Mental Status: He is alert and oriented to person, place, and time.   Psychiatric:         Mood and Affect: Mood normal.         Behavior: Behavior normal.         Assessment:       1. Hypogonadism male    2. History of testicular cancer    3. Fatigue, unspecified type          Plan:     1. We will treat him as a new patient for testosterone replacement therapy due to not being followed for the past 2 years. Pt verbalized understanding  2. Labs are due in 3 months, 3 days after last injection. Pt verbalized understanding    F/u   3 months    [facility]: Active    Sujata Romero, HARJIT

## 2022-09-01 ENCOUNTER — TELEPHONE (OUTPATIENT)
Dept: SURGERY | Facility: CLINIC | Age: 37
End: 2022-09-01
Payer: COMMERCIAL

## 2022-09-01 NOTE — TELEPHONE ENCOUNTER
Call returned to Dr Yeung offic spoke to [facility]. Confirmed patient does not have any procedures scheduled with Dr Shayne Freitas. She Will f/u with patient to confirm.

## 2022-09-01 NOTE — TELEPHONE ENCOUNTER
----- Message from Cinthya Dailey sent at 9/1/2022 10:59 AM CDT -----  Contact: Dr Tejada  Type: Needs Medical Advice    Who Called: Dr Tejada's office  Best Call Back Number: [phone]  Inquiry/Question: Dr Tejada wants to know if it is alright for him to prescribe Narco or Percocet for after his procedure. Please call and advise. They will also fax orders.       Thank you~